# Patient Record
Sex: FEMALE
[De-identification: names, ages, dates, MRNs, and addresses within clinical notes are randomized per-mention and may not be internally consistent; named-entity substitution may affect disease eponyms.]

---

## 2019-06-10 PROBLEM — Z00.00 ENCOUNTER FOR PREVENTIVE HEALTH EXAMINATION: Status: ACTIVE | Noted: 2019-06-10

## 2019-06-11 ENCOUNTER — APPOINTMENT (OUTPATIENT)
Dept: OTOLARYNGOLOGY | Facility: CLINIC | Age: 34
End: 2019-06-11
Payer: COMMERCIAL

## 2019-06-11 DIAGNOSIS — J01.41 ACUTE RECURRENT PANSINUSITIS: ICD-10-CM

## 2019-06-11 DIAGNOSIS — Z87.09 PERSONAL HISTORY OF OTHER DISEASES OF THE RESPIRATORY SYSTEM: ICD-10-CM

## 2019-06-11 DIAGNOSIS — R07.0 PAIN IN THROAT: ICD-10-CM

## 2019-06-11 DIAGNOSIS — R49.0 DYSPHONIA: ICD-10-CM

## 2019-06-11 DIAGNOSIS — R05 COUGH: ICD-10-CM

## 2019-06-11 PROCEDURE — 99203 OFFICE O/P NEW LOW 30 MIN: CPT | Mod: 25

## 2019-06-11 PROCEDURE — 31575 DIAGNOSTIC LARYNGOSCOPY: CPT

## 2019-06-11 RX ORDER — PREDNISONE 10 MG/1
10 TABLET ORAL
Qty: 6 | Refills: 0 | Status: ACTIVE | COMMUNITY
Start: 2019-06-11 | End: 1900-01-01

## 2019-06-11 RX ORDER — DOXYCYCLINE 100 MG/1
100 CAPSULE ORAL TWICE DAILY
Qty: 14 | Refills: 0 | Status: ACTIVE | COMMUNITY
Start: 2019-06-11 | End: 1900-01-01

## 2019-06-11 RX ORDER — ALBUTEROL 90 MCG
AEROSOL (GRAM) INHALATION
Refills: 0 | Status: ACTIVE | COMMUNITY

## 2019-06-11 RX ORDER — LEVONORGESTREL AND ETHINYL ESTRADIOL 0.1-0.02MG
KIT ORAL
Refills: 0 | Status: ACTIVE | COMMUNITY

## 2019-06-11 RX ORDER — MONTELUKAST SODIUM 10 MG/1
TABLET, FILM COATED ORAL
Refills: 0 | Status: ACTIVE | COMMUNITY

## 2019-06-11 NOTE — HISTORY OF PRESENT ILLNESS
[de-identified] : She presents today with several upper airway and throat concerns.\par \par She reports that approximately 2 weeks ago she was exposed to a dog and she is allergic to dogs. She developed acute symptoms. Generally the symptoms resolve within 2 days but unfortunately the illness progressed to a low-grade fever and a general sense of not feeling well.\par \par Graeme was evaluated by her primary care physician. He felt that she had an allergy with a superimposed viral illness.\par Supportive care was recommended. According to her a throat culture was negative.\par \par At this point however she is having evolving throat discomfort, itchiness of throat as well as right-sided ear pain.\par \par She generally does not require using albuterol but at this point she is using 4 times a week.\par She also reports a hoarse voice.

## 2019-06-11 NOTE — CONSULT LETTER
[Dear  ___] : Dear  [unfilled], [Courtesy Letter:] : I had the pleasure of seeing your patient, [unfilled], in my office today. [Please see my note below.] : Please see my note below. [FreeTextEntry3] : Jair Hassan MD [Sincerely,] : Sincerely,

## 2019-06-11 NOTE — PROCEDURE
[de-identified] : PROCEDURE: Flexible laryngoscopy\par SURGEON: Dr. Hassan\par INDICATIONS: He was unable to tolerate a mirror exam. Assess for laryngopharynx pharyngeal reflux. cough. head and neck mass. \par ANESTHESIA: The patient was placed in a sitting position.  Following application of the topical anesthetic and decongestant, exam was performed with a flexible scope.  The scope was passed along the right nasal floor to the nasopharynx.  It was then passed into the region of the middle meatus, middle turbinate, and sphenoethmoid region.  An identical procedure was performed on the left side.  The following findings were noted:\par \par The nasal musoca was healthy appearing and the septum was roughly midline. The middle meatus and sphenoethmoid recesses were clear bilaterally. The nasopharynx \par \par Nasopharynx: no masses, choanae patent, no adenoid tissue\par \par Base of tongue/vallecula: no masses or asymmetry\par Pharyngeal walls: symmetrical. No masses\par Pyriform sinuses: no lesions or pooling of secretions\par Epiglottis: normal. No edema or lesions\par Aryepiglottic folds: normal. No lesions. \par Vocal cords: clear and mobile. No lesions. Airway patent\par Arytenoids: no edema or erythema \par Interarytenoid area: no edema, erythema or lesion.\par  \par a culture of her posterior right sinus outflow tract was taken.

## 2019-06-24 LAB — BACTERIA FLD CULT: ABNORMAL

## 2023-02-02 NOTE — ASSESSMENT
Discharge Summary/Instructions after an Endoscopic Procedure  Patient Name: Isidro Alves  Patient MRN: [id number]  Patient YOB: 1954 Thursday, February 2, 2023 Anthony Meng MD  Dear patient,  As a result of recent federal legislation (The Federal Cures Act), you may   receive lab or pathology results from your procedure in your [facility]   account before your physician is able to contact you. Your physician or   their representative will relay the results to you with their   recommendations at their soonest availability.  Thank you,  RESTRICTIONS:  During your procedure today, you received medications for sedation.  These   medications may affect your judgment, balance and coordination.  Therefore,   for 24 hours, you have the following restrictions:   - DO NOT drive a car, operate machinery, make legal/financial decisions,   sign important papers or drink alcohol.    ACTIVITY:  Today: no heavy lifting, straining or running due to procedural   sedation/anesthesia.  The following day: return to full activity including work.  DIET:  Eat and drink normally unless instructed otherwise.     TREATMENT FOR COMMON SIDE EFFECTS:  - Mild abdominal pain, nausea, belching, bloating or excessive gas:  rest,   eat lightly and use a heating pad.  - Sore Throat: treat with throat lozenges and/or gargle with warm salt   water.  - Because air was used during the procedure, expelling large amounts of air   from your rectum or belching is normal.  - If a bowel prep was taken, you may not have a bowel movement for 1-3 days.    This is normal.  SYMPTOMS TO WATCH FOR AND REPORT TO YOUR PHYSICIAN:  1. Abdominal pain or bloating, other than gas cramps.  2. Chest pain.  3. Back pain.  4. Signs of infection such as: chills or fever occurring within 24 hours   after the procedure.  5. Rectal bleeding, which would show as bright red, maroon, or black stools.   (A tablespoon of blood from the rectum is not serious,  [FreeTextEntry1] : I agree that she initially had an allergy and likely a viral URI. This has progressed to a bacterial sinobronchitis with exacerbation of her asthma.  This is giving her a low-grade laryngitis as well.\par She is not wheezing.\par \par On pericolic treat her with doxycycline pending her culture result.\par I will also treat with 3 days of 20 mg of prednisone.\par \par She has some upcoming air travel next week and have recommended Afrin for 3 days as well as Mucinex.\par Pain or clinical course further workup and/or chest x-ray may be necessary. especially if   hemorrhoids are present.)  6. Vomiting.  7. Weakness or dizziness.  GO DIRECTLY TO THE NEAREST EMERGENCY ROOM IF YOU HAVE ANY OF THE FOLLOWING:      Difficulty breathing              Chills and/or fever over 101 F   Persistent vomiting and/or vomiting blood   Severe abdominal pain   Severe chest pain   Black, tarry stools   Bleeding- more than one tablespoon   Any other symptom or condition that you feel may need urgent attention  Your doctor recommends these additional instructions:  If any biopsies were taken, your doctors clinic will contact you in 1 to 2   weeks with any results.  - Discharge patient to home.   - Resume previous diet.   - Continue present medications.   - Repeat colonoscopy in 3 years for surveillance as prepr was fair  - Return to referring physician as previously scheduled.   - Patient has a contact number available for emergencies.  The signs and   symptoms of potential delayed complications were discussed with the   patient.  Return to normal activities tomorrow.  Written discharge   instructions were provided to the patient.  For questions, problems or results please call your physician Anthony Meng MD at Work:  [phone]  If you have any questions about the above instructions, call the GI   department at [phone] or call the endoscopy unit at [phone]   from 7am until 3 pm.  [facility] [place], EMERGENCY ROOM PHONE NUMBER:   [phone]  IF A COMPLICATION OR EMERGENCY SITUATION ARISES AND YOU ARE UNABLE TO REACH   YOUR PHYSICIAN - GO DIRECTLY TO THE EMERGENCY ROOM.  I have read or have had read to me these discharge instructions for my   procedure and have received a written copy.  I understand these   instructions and will follow-up with my physician if I have any questions.     __________________________________       _____________________________________  Nurse Signature                                           Patient/Designated   Responsible Party Signature  MD Anthony Schuster MD  2/2/2023 10:47:20 AM  This report has been verified and signed electronically.  Dear patient,  As a result of recent federal legislation (The Federal Cures Act), you may   receive lab or pathology results from your procedure in your [facility]   account before your physician is able to contact you. Your physician or   their representative will relay the results to you with their   recommendations at their soonest availability.  Thank you,  [employer]